# Patient Record
Sex: MALE | Race: WHITE | Employment: OTHER | URBAN - METROPOLITAN AREA
[De-identification: names, ages, dates, MRNs, and addresses within clinical notes are randomized per-mention and may not be internally consistent; named-entity substitution may affect disease eponyms.]

---

## 2021-08-02 ENCOUNTER — APPOINTMENT (OUTPATIENT)
Dept: GENERAL RADIOLOGY | Age: 71
End: 2021-08-02
Payer: MEDICARE

## 2021-08-02 ENCOUNTER — HOSPITAL ENCOUNTER (EMERGENCY)
Age: 71
Discharge: HOME | End: 2021-08-02
Attending: EMERGENCY MEDICINE
Payer: MEDICARE

## 2021-08-02 VITALS
DIASTOLIC BLOOD PRESSURE: 74 MMHG | WEIGHT: 170 LBS | BODY MASS INDEX: 24.34 KG/M2 | HEIGHT: 70 IN | HEART RATE: 80 BPM | TEMPERATURE: 97.8 F | RESPIRATION RATE: 16 BRPM | OXYGEN SATURATION: 100 % | SYSTOLIC BLOOD PRESSURE: 126 MMHG

## 2021-08-02 DIAGNOSIS — S92.501A CLOSED FRACTURE OF PHALANX OF RIGHT FIFTH TOE, INITIAL ENCOUNTER: Primary | ICD-10-CM

## 2021-08-02 PROCEDURE — 6370000000 HC RX 637 (ALT 250 FOR IP): Performed by: EMERGENCY MEDICINE

## 2021-08-02 PROCEDURE — 96365 THER/PROPH/DIAG IV INF INIT: CPT

## 2021-08-02 PROCEDURE — 73502 X-RAY EXAM HIP UNI 2-3 VIEWS: CPT

## 2021-08-02 PROCEDURE — 6360000002 HC RX W HCPCS: Performed by: PODIATRIST

## 2021-08-02 PROCEDURE — 73630 X-RAY EXAM OF FOOT: CPT

## 2021-08-02 PROCEDURE — 96375 TX/PRO/DX INJ NEW DRUG ADDON: CPT

## 2021-08-02 PROCEDURE — 99284 EMERGENCY DEPT VISIT MOD MDM: CPT

## 2021-08-02 PROCEDURE — 6360000002 HC RX W HCPCS: Performed by: EMERGENCY MEDICINE

## 2021-08-02 RX ORDER — ONDANSETRON 2 MG/ML
4 INJECTION INTRAMUSCULAR; INTRAVENOUS ONCE
Status: COMPLETED | OUTPATIENT
Start: 2021-08-02 | End: 2021-08-02

## 2021-08-02 RX ORDER — OXYCODONE HYDROCHLORIDE AND ACETAMINOPHEN 5; 325 MG/1; MG/1
1 TABLET ORAL EVERY 8 HOURS PRN
Qty: 15 TABLET | Refills: 0 | Status: SHIPPED | OUTPATIENT
Start: 2021-08-02 | End: 2021-08-09

## 2021-08-02 RX ORDER — OXYCODONE HYDROCHLORIDE 5 MG/1
5 TABLET ORAL ONCE
Status: COMPLETED | OUTPATIENT
Start: 2021-08-02 | End: 2021-08-02

## 2021-08-02 RX ORDER — CEPHALEXIN 500 MG/1
500 CAPSULE ORAL 4 TIMES DAILY
Qty: 40 CAPSULE | Refills: 0 | Status: SHIPPED | OUTPATIENT
Start: 2021-08-02 | End: 2021-08-12

## 2021-08-02 RX ORDER — SULFAMETHOXAZOLE AND TRIMETHOPRIM 800; 160 MG/1; MG/1
1 TABLET ORAL 2 TIMES DAILY
Qty: 20 TABLET | Refills: 0 | Status: SHIPPED | OUTPATIENT
Start: 2021-08-02 | End: 2021-08-12

## 2021-08-02 RX ADMIN — OXYCODONE 5 MG: 5 TABLET ORAL at 15:42

## 2021-08-02 RX ADMIN — ONDANSETRON 4 MG: 2 INJECTION INTRAMUSCULAR; INTRAVENOUS at 16:50

## 2021-08-02 RX ADMIN — CEFAZOLIN 2000 MG: 10 INJECTION, POWDER, FOR SOLUTION INTRAVENOUS at 16:22

## 2021-08-02 ASSESSMENT — PAIN SCALES - GENERAL
PAINLEVEL_OUTOF10: 7
PAINLEVEL_OUTOF10: 0
PAINLEVEL_OUTOF10: 7

## 2021-08-02 ASSESSMENT — PAIN DESCRIPTION - ORIENTATION: ORIENTATION: RIGHT

## 2021-08-02 ASSESSMENT — PAIN DESCRIPTION - FREQUENCY: FREQUENCY: CONTINUOUS

## 2021-08-02 ASSESSMENT — PAIN DESCRIPTION - PAIN TYPE: TYPE: ACUTE PAIN

## 2021-08-02 ASSESSMENT — PAIN DESCRIPTION - DESCRIPTORS: DESCRIPTORS: SHARP

## 2021-08-02 ASSESSMENT — PAIN DESCRIPTION - LOCATION: LOCATION: TOE (COMMENT WHICH ONE)

## 2021-08-02 NOTE — CONSULTS
Department of Podiatry Consult Note  Resident       Reason for Consult:  Right 5th toe Fracture  Requesting Physician:  Dr. Agustin Li MD    CHIEF COMPLAINT:  Right 5th toe fracture    HISTORY OF PRESENT ILLNESS:    The patient is a 72 y.o. male with significant past medical history as listed below who is consulted for right fifth toe fracture with laceration in between digits 4 and 5. Patient states that at around 10 AM he fell off a ladder and hurt his fifth toe and cut it open. Patient states when he fell he hurt his hip, but otherwise denies trauma or pain specifically to his back or head. Patient is well-known with Dr. Aldo Mccallum DPM, and patient states that he went to Dr. Afshin Ribera clinic to  his orthotics and told Dr. Sofya Estrella about him falling off a ladder and cutting himself on his foot. Dr. Sofya Estrella took x-rays in his clinic and was worried about an open fracture, so patient was sent to the ED for further evaluation and treatment. He states that his pain is a 6 out of 10, out of a 0/10 scale. Patient denies any other pedal complaints. Patient denies any nausea, vomiting, fever, chills, or shortness of breath. Past Medical History:        Diagnosis Date    Hyperlipidemia     Hypertension        Past Surgical History:    History reviewed. No pertinent surgical history. Allergies:   Patient has no known allergies. Medications:   Home Meds  No current facility-administered medications on file prior to encounter. No current outpatient medications on file prior to encounter. Current Meds  No current facility-administered medications for this encounter. Family History:   History reviewed. No pertinent family history. Social History:   TOBACCO:   reports that he has quit smoking. He has never used smokeless tobacco.  ETOH:   reports previous alcohol use. DRUGS:   reports previous drug use.       REVIEW OF SYSTEMS:    A 10 point review of systems was conducted, significant findings as noted in HPI. All other systems negative. PHYSICAL EXAM:      Vitals:    BP (!) 176/104   Pulse 84   Temp 97.8 °F (36.6 °C) (Oral)   Resp 16   Ht 5' 10\" (1.778 m)   Wt 170 lb (77.1 kg)   SpO2 96%   BMI 24.39 kg/m²     LABS:   No results for input(s): WBC, HGB, HCT, PLT in the last 72 hours. No results for input(s): NA, K, CL, CO2, PHOS, BUN, CREATININE in the last 72 hours. Invalid input(s): CA  No results for input(s): PROT, INR, APTT in the last 72 hours. GENERAL: Patient is alert and oriented x3. No signs of acute distress noted. LOWER EXTREMITY EXAMINATION:  VASCULAR: DP and PT pulses are palpable 2/4 b/l. CFT is brisk to the digits of the foot b/l. Skin temperature is warm to cool from proximal to distal with no focal calor noted. Edema noted surrounding the fifth digit of the right foot. No pain with calf compression b/l. NEUROLOGIC: Gross and epicritic sensation is intact b/l. Protective sensation is intact at all pedal sites b/l. DERMATOLOGIC:  Patient provided verbal consent obtained photos today:        Right lower extremity:  Superficial laceration noted in between digits 4 and 5, measuring approximately 1.5 cm in length. The wound base is granular in nature with a erythematous periwound. The wound does not probe to bone. No purulent drainage, tracking, tunneling, crepitus, fluctuance, or acute signs of infection noted. Left LE soft tissue envelope is closed without ulceration. MUSCULOSKELETAL: Muscle strength is 5/5 for all pedal groups tested. Pain with palpation to and around the fifth digit, right foot. Ankle joint ROM is decreased in dorsiflexion with the knee extended. No obvious biomechanical abnormalities.       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  -Prescription for Percocet was given to patient for 7 days  -Patient to follow-up with Dr. Jasmin Franklin, D.P.M. on Thursday in his private clinic      DISPO: Fracture of the fifth digit with laceration 2/2 trauma, right foot. All imaging reviewed. Laceration was washed out with copious amounts of saline and Betadine. Patient okay to discharge from podiatric standpoint. Patient given prescription for antibiotics and Percocet. Patient to follow-up with Dr. Jasmin Franklin in his private office. Will sign off on the patient. If any other pedal problems occur please contact us. Thank you for the opportunity to take part in the patient's care.  The patient assessment and plan was discussed with Dr. Jasmin Franklin DPM.      Mary Ellen Benavidez DPM   Podiatric Resident PGY1  Pager [phone] or [place]  8/2/2021, 7:51 PM

## 2021-08-02 NOTE — ED NOTES
Bed: A03-03  Expected date:   Expected time:   Means of arrival:   Comments:  [address] [place], [place] [address]aixa Fernandez, RN  08/02/21 7291

## 2021-08-02 NOTE — ED PROVIDER NOTES
[address]          ATTENDING PHYSICIAN NOTE       Date of evaluation: 8/2/2021    Chief Complaint     Toe Injury (fall from ladder, open fracture to right pinky toe)      History of Present Illness     Kenyatta Maldonado is a 72 y.o. male presenting for right toe injury. Patient states he was climbing a ladder at his home today when he slipped off the ladder. He injured his right fifth toe. He had an appointment to  orthotics from podiatry today and mentioned it to his podiatrist.  They examined him and obtained an x-ray that shows 1/5 toe fracture. There is concerned that it is open based on a skin break in the area. He was referred here for IV antibiotics and possible operative intervention. He denies injury elsewhere. Review of Systems     Pertinent positive and negative findings as documented in the HPI. Otherwise all other systems were reviewed and were negative. Physical Exam     INITIAL VITALS: BP: (!) 176/104, Temp: 97.8 °F (36.6 °C), Pulse: 84, Resp: 16, SpO2: 96 %     Nursing note and vitals reviewed. General:  Adult male, alert and appropriately interactive. In no distress. HENT: Normocephalic and atraumatic. External ears normal. Nose appears normal externally. Eyes: Conjunctivae normal. No scleral icterus. Neck: Neck supple. No tracheal deviation present. CV: Normal rate. Regular rhythm. 2+ DP pulses  Pulm: Effort normal on room air. Musculoskeletal: Ecchymosis and slight edema of the right fifth toe, slight skin break in the interwebspace. Neurological: Alert and appropriately interactive. Face symmetric, speech without dysarthria or obvious aphasia. Moving all extremities spontaneously. Sensation intact throughout right foot and fifth toe. Skin: Warm, dry. No rash. No diaphoresis or erythema. Psychiatric: Calm and cooperative with appropriate mood and affect.      Procedures   Procedures    MEDICAL DECISION MAKING     MDM: Kiana Buchanan is a 72 y.o. male with history as above presenting for a right fifth toe injury. On arrival, patient in no distress, vital signs reassuring. He has some ecchymosis and a small skin break in the interwebspace near the fifth toe. He has reportedly received outpatient x-rays in podiatry clinic. He was given IV cefazolin and podiatry was consulted. Ultimately, they did not feel his wound clearly represented open fracture as the skin break in the webspace does not appear related to the fracture. They did request a postop shoe and crutches for heel weightbearing which were provided. He was discharged with prescription for pain medications as well as Keflex and Bactrim per their request for 1 week. Clinical Impression     1. Closed fracture of phalanx of right fifth toe, initial encounter        Disposition     DISPOSITION Decision To Discharge 08/02/2021 05:17:47 PM        Love Stewart MD  10:12 PM                     Past Medical, Surgical, Family, and Social History     He has a past medical history of Hyperlipidemia and Hypertension. He has no past surgical history on file. His family history is not on file. He reports that he has quit smoking. He has never used smokeless tobacco. He reports previous alcohol use. He reports previous drug use. Medications     Discharge Medication List as of 8/2/2021  5:31 PM          Allergies     He has No Known Allergies. ED Course     Nursing Notes, Past Medical Hx, Past Surgical Hx, Social Hx,Allergies, and Family Hx were reviewed.     Patient was given the following medications:  Orders Placed This Encounter   Medications    ceFAZolin (ANCEF) 2000 mg in dextrose 5 % 50 mL IVPB     Order Specific Question:   Antimicrobial Indications     Answer:   Bone and Joint Infection    oxyCODONE (ROXICODONE) immediate release tablet 5 mg    ondansetron (ZOFRAN) injection 4 mg    oxyCODONE-acetaminophen (PERCOCET) 5-325 MG per tablet     Sig: Take 1 [id number]  [phone]    If symptoms worsen      DISCHARGE MEDICATIONS:  Discharge Medication List as of 8/2/2021  5:31 PM      START taking these medications    Details   oxyCODONE-acetaminophen (PERCOCET) 5-325 MG per tablet Take 1 tablet by mouth every 8 hours as needed for Pain for up to 7 days.  Take lowest dose possible to manage pain, Disp-15 tablet, R-0Print      sulfamethoxazole-trimethoprim (BACTRIM DS) 800-160 MG per tablet Take 1 tablet by mouth 2 times daily for 10 days, Disp-20 tablet, R-0Print      cephALEXin (KEFLEX) 500 MG capsule Take 1 capsule by mouth 4 times daily for 10 days, Disp-40 capsule, R-0Print                Ed Rizo MD  08/02/21 [id number]